# Patient Record
Sex: FEMALE | Race: WHITE | NOT HISPANIC OR LATINO | Employment: FULL TIME | URBAN - METROPOLITAN AREA
[De-identification: names, ages, dates, MRNs, and addresses within clinical notes are randomized per-mention and may not be internally consistent; named-entity substitution may affect disease eponyms.]

---

## 2021-10-26 NOTE — PROGRESS NOTES
Hand Therapy Initial Evaluation  Current Date:  11/1/2021    Diagnosis: Left Wrist Pain  DOI: 9/18/2021  Post:  6w 2d    Precautions: N/A    Subjective:  Anne Torres is a 31 year old female.    Patient reports symptoms of the left wrist which occurred due to overuse in the garden. Since onset symptoms are Gradually getting better.  General health as reported by patient is good.  Pertinent medical history includes:Anemia, Depression, Migraines/Headaches, Thyroid Problems  Medical allergies:sulfa/codiene.  Surgical history: other: jaw.  Medication history: Sleep, Thyroid.    Current occupation is Non Profit   Job Tasks: Computer Work, Prolonged Sitting    Occupational Profile Information:  Right hand dominant  Prior functional level:  no limitations  Patient reports symptoms of pain  Special tests:  none.    Previous treatment: OTC wrist splint -day with use, at night for awhile but has since discontinued  Barriers include:none  Mobility: No difficulty  Transportation: drives  Currently working in normal job without restrictions  Leisure activities/hobbies: cooking, gardening, reading  Other: Has a dog and a cat    Functional Outcome Measure:   Upper Extremity Functional Index Score:  SCORE:   Column Totals: /80: 67   (A lower score indicates greater disability.)    Objective:    Pain Level (Scale 0-10):   11/1/21   At Rest 0-3   With Use 2-6     Pain Description:  Date 11/1/21   Location Left Wrist: webspace, radial wrist, dorsal forearm, occasional tingle in ulnar hand   Pain Quality Aching, fatigue, occasional twinge of pain   Frequency intermittent, constant or daily     Pain is worst daytime and can occasionally keep up at night   Exacerbated by Weightbearing into hand, lifting something heavy, prolonged typing, prolonged repetitive use in the wrist, prolonged driving   Relieved by Splint, rest, heat   Progression Gradually improving     ROM  Pain Report: - none  + mild    ++ moderate    +++  severe   Wrist 11/1/21 11/1/21   AROM (PROM) Right Left   Extension 79 79-   Flexion 82 85+ very mild radial wrist   RD 15 15-   UD 40 35-   Supination 85 85+ very mild volar radial wrist   Pronation 85 85+ very mild radial dorsal wrist   Thumb Flex with Wrist UD 10- 10+     ROM Thumb - WNL bilaterally, no pain with AROM    Strength   (Measured in pounds)  Pain Report: - none  + mild    ++ moderate    +++ severe    11/1/21 11/1/21   Trials Right Left   1  2  3 58 32 to pain   Average 58 32     Lat Pinch 11/1/21 11/1/21   Trials Right Left   1  2  3 15 10 to pain   Average 15 10     3 Pt Pinch 11/1/21 11/1/21   Trials Right Left   1  2  3 13 7 to pain   Average 13 7     Edema:  None    Scar/Wound:  N/A    Sensation: Patient endorses intermittent tingling in SF, likely due to positioning/leaning on elbow.    Special Tests:   Date 11/1/21   Side Left   Tinels CT -   Tinels PIN -   Tinels DSRN -   Tinels cubital tunnel + mild   Tinels [place] -     Resisted Testing  Pain Report: - none  + mild    ++ moderate    +++ severe    11/1/21   APL +   EPB -   EPL -   FCR -   Wrist Flex -   Wrist Ext -   1st DI -     Special Tests   Pain Report:  - none    + mild    ++ moderate    +++ severe    11/1/21   Finkelsteins +   Radial Nerve Tinel's (DRSN) -   Th CMC Passive Retropulsion -     Palpation  Pain Report:  - none    + mild    ++ moderate    +++ severe    11/1/21   Radial Styloid -   1st DC -   FCR +   Thumb CMC -   PIN Site +   Extensor Wad +   LEP -   MEP -   Flexor Wad -   Adductor +   1st DI +     Assessment:  Patient presents with symptoms consistent with diagnosis as listed above with conservative intervention.     Patient's limitations or Problem List includes:  Pain, Weakness, Decreased stability, Decreased , Decreased pinch and Tightness in musculature of the left wrist, thumb and forearm which interferes with the patient's ability to perform Work Tasks, Sleep Patterns, Recreational Activities, Household  Chores and Driving  as compared to previous level of function.    Rehab Potential:  Excellent - Return to full activity, no limitations    Patient will benefit from skilled Occupational Therapy to increase  strength and stability of wrist and decrease pain and muscular tension to return to previous activity level and resume normal daily tasks and to reach their rehab potential.    Barriers to Learning:  No barrier    Communication Issues:  Patient appears to be able to clearly communicate and understand verbal and written communication and follow directions correctly.    Chart Review: Detailed history review with patient    Identified Performance Deficits: driving and community mobility, home establishment and management, meal preparation and cleanup, sleep, work and leisure activities    Assessment of Occupational Performance:  5 or more Performance Deficits    Clinical Decision Making (Complexity): Low complexity    Treatment Explanation:  The following has been discussed with the patient:  RX ordered/plan of care  Anticipated outcomes  Possible risks and side effects    Plan:  Frequency:  1 X week, once daily  Duration:  for 6 weeks    Treatment Plan:  Therapeutic Exercise:  Isotonics and Isometrics  Neuromuscular re-education:  Nerve Gliding and Kinesiotaping  Manual Techniques:  Friction massage and Myofascial release  Orthotic Fabrication:  Forearm based  Self Care:  Self Care Tasks and Ergonomic Considerations    Home Program:  Warmth - Sessions: 3-5 x days week, Epsom salt soaks   Ball Massage to FA flexors and extensors  Golf Ball Massage to Thumb   Adductor release with clip  FA flexor and Extensor stretches  K-tape trial  - radial thumb and wrist up to lateral elbow    Next visit:   Check existing splint, consider ottobock   Check response to K-tape trial  Laser vs US trial  STM to FA flexors, extensors, adductor  Add JUJU sy  Future visits: wrist stability strengthening    Discharge Plan:  Achieve  all LTG.  Independent in home treatment program.  Reach maximal therapeutic benefit.    Please see daily flow sheet for treatment and 1:1 time provided today.

## 2021-11-01 ENCOUNTER — THERAPY VISIT (OUTPATIENT)
Dept: OCCUPATIONAL THERAPY | Facility: CLINIC | Age: 31
End: 2021-11-01
Payer: COMMERCIAL

## 2021-11-01 DIAGNOSIS — M25.532 LEFT WRIST PAIN: ICD-10-CM

## 2021-11-01 PROCEDURE — 97140 MANUAL THERAPY 1/> REGIONS: CPT | Mod: GO | Performed by: OCCUPATIONAL THERAPIST

## 2021-11-01 PROCEDURE — 97165 OT EVAL LOW COMPLEX 30 MIN: CPT | Mod: GO | Performed by: OCCUPATIONAL THERAPIST

## 2021-11-01 PROCEDURE — 97110 THERAPEUTIC EXERCISES: CPT | Mod: GO | Performed by: OCCUPATIONAL THERAPIST

## 2021-11-05 NOTE — PROGRESS NOTES
SOAP Note Objective Information for 11/11/2021    Pain Level (Scale 0-10):   11/1/21 11/11/21   At Rest 0-3 0-1   With Use 2-6 2-4     Pain Description:  Date 11/1/21   Location Left Wrist: webspace, radial wrist, dorsal forearm, occasional tingle in ulnar hand   Pain Quality Aching, fatigue, occasional twinge of pain   Frequency intermittent, constant or daily     Pain is worst daytime and can occasionally keep up at night   Exacerbated by Weightbearing into hand, lifting something heavy, prolonged typing, prolonged repetitive use in the wrist, prolonged driving   Relieved by Splint, rest, heat   Progression Gradually improving     Home Program:  Warmth - Sessions: 3-5 x days week, Epsom salt soaks   Ball Massage to FA flexors and extensors  Golf Ball Massage to Thumb   Adductor release with clip  FA flexor and Extensor stretches  K-tape trial  - radial thumb and wrist up to lateral elbow  Proximal RN glides  Custom [place] - night and day per symptoms    Next visit:   Check ottobock   K-tape if wanted - instruct in application  Laser   STM to FA flexors, extensors, adductor  Instruct in pain free wrist stability strengthening    Please see daily flow sheet for treatment and 1:1 time provided today.

## 2021-11-11 ENCOUNTER — THERAPY VISIT (OUTPATIENT)
Dept: OCCUPATIONAL THERAPY | Facility: CLINIC | Age: 31
End: 2021-11-11
Payer: COMMERCIAL

## 2021-11-11 DIAGNOSIS — M25.532 LEFT WRIST PAIN: ICD-10-CM

## 2021-11-11 PROCEDURE — 97140 MANUAL THERAPY 1/> REGIONS: CPT | Mod: GO | Performed by: OCCUPATIONAL THERAPIST

## 2021-11-11 PROCEDURE — 97026 INFRARED THERAPY: CPT | Mod: GO | Performed by: OCCUPATIONAL THERAPIST

## 2021-11-11 PROCEDURE — 97760 ORTHOTIC MGMT&TRAING 1ST ENC: CPT | Mod: GO | Performed by: OCCUPATIONAL THERAPIST

## 2021-11-11 PROCEDURE — 97112 NEUROMUSCULAR REEDUCATION: CPT | Mod: GO | Performed by: OCCUPATIONAL THERAPIST

## 2021-11-15 NOTE — PROGRESS NOTES
SOAP Note Objective Information for 11/19/2021    Pain Level (Scale 0-10):   11/1/21 11/11/21 11/19/21   At Rest 0-3 0-1 0-1   With Use 2-6 2-4 2-4     Pain Description:  Date 11/1/21   Location Left Wrist: webspace, radial wrist, dorsal forearm, occasional tingle in ulnar hand   Pain Quality Aching, fatigue, occasional twinge of pain   Frequency intermittent, constant or daily     Pain is worst daytime and can occasionally keep up at night   Exacerbated by Weightbearing into hand, lifting something heavy, prolonged typing, prolonged repetitive use in the wrist, prolonged driving   Relieved by Splint, rest, heat   Progression Gradually improving     Home Program:  Warmth - Sessions: 3-5 x days week, Epsom salt soaks   Ball Massage to FA flexors and extensors  Golf Ball Massage to Thumb   Adductor release with clip  FA flexor and Extensor stretches  K-tape trial  - radial thumb and wrist up to lateral elbow  Proximal RN glides  Custom [place] - night and day per symptoms  Wrist strengthening 3 x week:  Door frame pull backs    Next visit:   Laser   STM to FA flexors, extensors, adductor  Progress pain free wrist stability strengthening - isometrics  K-tape    Please see daily flow sheet for treatment and 1:1 time provided today.

## 2021-11-18 NOTE — PROGRESS NOTES
SOAP Note Objective Information for 11/24/2021:    Pain Level (Scale 0-10):   11/1/21 11/11/21 11/24/21   At Rest 0-3 0-1 0-1   With Use 2-6 2-4 2-3     Pain Description:  Date 11/1/21   Location Left Wrist: webspace, radial wrist, dorsal forearm, occasional tingle in ulnar hand   Pain Quality Aching, fatigue, occasional twinge of pain   Frequency intermittent, constant or daily     Pain is worst daytime and can occasionally keep up at night   Exacerbated by Weightbearing into hand, lifting something heavy, prolonged typing, prolonged repetitive use in the wrist, prolonged driving   Relieved by Splint, rest, heat   Progression Gradually improving     Home Program:  Warmth - Sessions: 3-5 x days week, Epsom salt soaks   Ball Massage to FA flexors and extensors  Golf Ball Massage to Thumb   Adductor release with clip  FA flexor and Extensor stretches  K-tape trial  - radial thumb and wrist up to lateral elbow  Proximal RN glides  Custom [place] - night and day per symptoms  Wrist Strengthening:  Door Frame pull backs  Wrist isometrics - flexion and extension    Next visit:   Check response to wrist isometrics  Laser   STM to FA flexors, extensors, adductor  K-tape  Next visit 2 weeks out    Please see daily flow sheet for treatment and 1:1 time provided today.

## 2021-11-19 ENCOUNTER — THERAPY VISIT (OUTPATIENT)
Dept: OCCUPATIONAL THERAPY | Facility: CLINIC | Age: 31
End: 2021-11-19
Payer: COMMERCIAL

## 2021-11-19 DIAGNOSIS — M25.532 LEFT WRIST PAIN: ICD-10-CM

## 2021-11-19 PROCEDURE — 97110 THERAPEUTIC EXERCISES: CPT | Mod: GO | Performed by: OCCUPATIONAL THERAPIST

## 2021-11-19 PROCEDURE — 97140 MANUAL THERAPY 1/> REGIONS: CPT | Mod: GO | Performed by: OCCUPATIONAL THERAPIST

## 2021-11-19 PROCEDURE — 97026 INFRARED THERAPY: CPT | Mod: GO | Performed by: OCCUPATIONAL THERAPIST

## 2021-11-24 ENCOUNTER — THERAPY VISIT (OUTPATIENT)
Dept: OCCUPATIONAL THERAPY | Facility: CLINIC | Age: 31
End: 2021-11-24
Payer: COMMERCIAL

## 2021-11-24 DIAGNOSIS — M25.532 LEFT WRIST PAIN: ICD-10-CM

## 2021-11-24 PROCEDURE — 97026 INFRARED THERAPY: CPT | Mod: GO | Performed by: OCCUPATIONAL THERAPIST

## 2021-11-24 PROCEDURE — 97110 THERAPEUTIC EXERCISES: CPT | Mod: GO | Performed by: OCCUPATIONAL THERAPIST

## 2021-11-24 PROCEDURE — 97140 MANUAL THERAPY 1/> REGIONS: CPT | Mod: GO | Performed by: OCCUPATIONAL THERAPIST

## 2021-12-01 NOTE — PROGRESS NOTES
SOAP Note Objective Information for 12/3/2021:    Pain Level (Scale 0-10):   11/1/21 11/11/21 11/24/21 12/3/21   At Rest 0-3 0-1 0-1 0-1   With Use 2-6 2-4 2-3 2     Pain Description:  Date 11/1/21   Location Left Wrist: webspace, radial wrist, dorsal forearm, occasional tingle in ulnar hand   Pain Quality Aching, fatigue, occasional twinge of pain   Frequency intermittent, constant or daily     Pain is worst daytime and can occasionally keep up at night   Exacerbated by Weightbearing into hand, lifting something heavy, prolonged typing, prolonged repetitive use in the wrist, prolonged driving   Relieved by Splint, rest, heat   Progression Gradually improving     Home Program:  Warmth - Sessions: 3-5 x days week, Epsom salt soaks   Ball Massage to FA flexors and extensors  Golf Ball Massage to Thumb   Adductor release with clip  FA flexor and Extensor stretches  K-tape trial  - radial thumb and wrist up to lateral elbow  Proximal RN glides  Custom [place] - night and day per symptoms  Wrist Strengthening 3 x week:  Door Frame pull backs  Wrist isometrics - flexion and extension    Next visit:   PN due - discuss discharge planning  Laser   STM to FA flexors, extensors, adductor  K-tape  Progress strengthening as indicated    Please see daily flow sheet for treatment and 1:1 time provided today.

## 2021-12-03 ENCOUNTER — THERAPY VISIT (OUTPATIENT)
Dept: OCCUPATIONAL THERAPY | Facility: CLINIC | Age: 31
End: 2021-12-03
Payer: COMMERCIAL

## 2021-12-03 DIAGNOSIS — M25.532 LEFT WRIST PAIN: ICD-10-CM

## 2021-12-03 PROCEDURE — 97026 INFRARED THERAPY: CPT | Mod: GO | Performed by: OCCUPATIONAL THERAPIST

## 2021-12-03 PROCEDURE — 97140 MANUAL THERAPY 1/> REGIONS: CPT | Mod: GO | Performed by: OCCUPATIONAL THERAPIST

## 2021-12-03 PROCEDURE — 97112 NEUROMUSCULAR REEDUCATION: CPT | Mod: GO | Performed by: OCCUPATIONAL THERAPIST

## 2021-12-12 ENCOUNTER — HEALTH MAINTENANCE LETTER (OUTPATIENT)
Age: 31
End: 2021-12-12

## 2021-12-16 NOTE — PROGRESS NOTES
Hand Therapy Progress Note  Reporting Period:  11/1/2021 through 12/20/2021    Diagnosis: Left Wrist Pain  DOI: 9/18/2021  Post:  3 months    Precautions: N/A    Initial History:  Anne Torres is a 31 year old female.    Patient reports symptoms of the left wrist which occurred due to overuse in the garden. Since onset symptoms are Gradually getting better.  General health as reported by patient is good.  Pertinent medical history includes:Anemia, Depression, Migraines/Headaches, Thyroid Problems  Medical allergies:sulfa/codiene.  Surgical history: other: jaw.  Medication history: Sleep, Thyroid.    Current occupation is Non Profit   Job Tasks: Computer Work, Prolonged Sitting    Occupational Profile Information:  Right hand dominant  Prior functional level:  no limitations  Patient reports symptoms of pain  Special tests:  none.    Previous treatment: OTC wrist splint -day with use, at night for awhile but has since discontinued  Barriers include:none  Mobility: No difficulty  Transportation: drives  Currently working in normal job without restrictions  Leisure activities/hobbies: cooking, gardening, reading  Other: Has a dog and a cat    Upper Extremity Functional Index Score:  SCORE:   Column Totals: /80: 73   (A lower score indicates greater disability.)    Subjective:  Subjective changes as noted by patient: The wrist has been feeling good! Its not even as fatigued after the strengthening anymore. It was tired after snow removal but rebounded quickly.  Functional changes noted by patient: Improvement in Recreational Activities and Household Chores  Response to previous treatment: good  Patient has noted adverse reaction to: None    Objective:    Pain Level (Scale 0-10):   11/1/21 11/11/21 11/24/21 12/3/21 12/20/21   At Rest 0-3 0-1 0-1 0-1 0   With Use 2-6 2-4 2-3 2 0-1     Pain Description:  Date 11/1/21 12/20/21   Location Left Wrist: webspace, radial wrist, dorsal forearm, occasional  tingle in ulnar hand Left Wrist: webspace, radial wrist, dorsal forearm, occasional tingle in ulnar hand   Pain Quality Aching, fatigue, occasional twinge of pain fatigue   Frequency intermittent, constant or daily   intermittent   Pain is worst daytime and can occasionally keep up at night daytime   Exacerbated by Weightbearing into hand, lifting something heavy, prolonged typing, prolonged repetitive use in the wrist, prolonged driving Opening a jar, bearing weight into wrist   Relieved by Splint, rest, heat Splint, exercises, rest, massage   Progression Gradually improving resolving     ROM  Pain Report: - none  + mild    ++ moderate    +++ severe   Wrist 11/1/21 11/1/21   AROM (PROM) Right Left   Extension 79 79-   Flexion 82 85+ very mild radial wrist   RD 15 15-   UD 40 35-   Supination 85 85+ very mild volar radial wrist   Pronation 85 85+ very mild radial dorsal wrist   Thumb Flex with Wrist UD 10- 10+     ROM Thumb - WNL bilaterally, no pain with AROM    Strength   (Measured in pounds)  Pain Report: - none  + mild    ++ moderate    +++ severe    11/1/21 11/1/21 12/20/21   Trials Right Left Left   1  2  3 58 32 to pain 41-   Average 58 32 41     Lat Pinch 11/1/21 11/1/21 12/20/21   Trials Right Left Left   1  2  3 15 10 to pain 12-   Average 15 10 12     3 Pt Pinch 11/1/21 11/1/21 12/20/21   Trials Right Left Left   1  2  3 13 7 to pain 10-   Average 13 7 10     Edema:  None    Scar/Wound:  N/A    Sensation: Patient endorses intermittent tingling in SF, likely due to positioning/leaning on elbow.    Special Tests:   Date 11/1/21 12/20/21   Side Left Left   Tinels CT - -   Tinels PIN - -   Tinels DSRN - -   Tinels cubital tunnel + mild -   Tinels [place] - -     Resisted Testing  Pain Report: - none  + mild    ++ moderate    +++ severe    11/1/21 12/20/21   APL + -   EPB - -   EPL - -   FCR - -   Wrist Flex - -   Wrist Ext - -   1st DI - -     Special Tests   Pain Report:  - none    + mild    ++  moderate    +++ severe    11/1/21 12/20/21   Finkelsteins + stretch   Radial Nerve Tinel's (DRSN) - NT   Th CMC Passive Retropulsion - NT     Palpation  Pain Report:  - none    + mild    ++ moderate    +++ severe    11/1/21 12/20/21   Radial Styloid - -   1st DC - -   FCR + +   Thumb CMC - -   PIN Site + -   Extensor Wad + -   LEP - -   MEP - -   Flexor Wad - -   Adductor + tender   1st DI + -   Intersection area  tender     Assessment:  Response to therapy has been improvement to:  Strength:   and pinch  Pain:  frequency is less, intensity of pain is decreased, duration of pain is decreased and less tender over affected area  Overall Assessment:  Patient's symptoms are resolving.  Patient is progressing well and is ready to discharge to home exercise program.  STG/LTG:  See goal sheet for details and updates of remaining functional limitations.     Plan:  Discharge to [facility]    Home Program:  Warmth - Sessions: 3-5 x days week, Epsom salt soaks   Ball Massage to FA flexors and extensors  Golf Ball Massage to Thumb   Adductor release with clip  FA flexor and Extensor stretches  K-tape trial  - radial thumb and wrist up to lateral elbow  Proximal RN glides  Custom [place] - night and day per symptoms  Wrist Strengthening 3 x week:  Door Frame pull backs  Wrist isometrics - flexion and extension    Please see daily flow sheet for treatment and 1:1 time provided today.

## 2021-12-20 ENCOUNTER — THERAPY VISIT (OUTPATIENT)
Dept: OCCUPATIONAL THERAPY | Facility: CLINIC | Age: 31
End: 2021-12-20
Payer: COMMERCIAL

## 2021-12-20 DIAGNOSIS — M25.532 LEFT WRIST PAIN: ICD-10-CM

## 2021-12-20 PROCEDURE — 97110 THERAPEUTIC EXERCISES: CPT | Mod: GO | Performed by: OCCUPATIONAL THERAPIST

## 2021-12-20 PROCEDURE — 97140 MANUAL THERAPY 1/> REGIONS: CPT | Mod: GO | Performed by: OCCUPATIONAL THERAPIST

## 2021-12-20 PROCEDURE — 97026 INFRARED THERAPY: CPT | Mod: GO | Performed by: OCCUPATIONAL THERAPIST

## 2022-10-03 ENCOUNTER — HEALTH MAINTENANCE LETTER (OUTPATIENT)
Age: 32
End: 2022-10-03

## 2022-12-21 ENCOUNTER — TRANSCRIBE ORDERS (OUTPATIENT)
Dept: OTHER | Age: 32
End: 2022-12-21

## 2022-12-21 DIAGNOSIS — R19.5 LOOSE STOOLS: Primary | ICD-10-CM

## 2022-12-22 ENCOUNTER — MYC MEDICAL ADVICE (OUTPATIENT)
Dept: CALL CENTER | Age: 32
End: 2022-12-22

## 2022-12-28 ENCOUNTER — TELEPHONE (OUTPATIENT)
Dept: GASTROENTEROLOGY | Facility: CLINIC | Age: 32
End: 2022-12-28

## 2022-12-28 NOTE — TELEPHONE ENCOUNTER
Caller: Anne Torres    Reason for Reschedule/Cancellation (please be detailed, any staff messages or encounters to note?): Needs to reschedule       Prior to reschedule please review:    Ordering Provider:Jasmine Atkinson MD     Sedation per order:NA    Does patient have any ASC Exclusions, please identify?: NA      Notes on Cancelled Procedure:    Procedure:Hydrogen Breath Test [HBT]     Date: 12/30    Location:[facility]; [address] [place] [address], [address] [place] [address]    Surgeon: NA        Rescheduled: Yes    Procedure: Hydrogen Breath Test [HBT]     Date: 01/6    Location:[facility]; [address] [place] [address], [address] [place] [address]    Surgeon: NA    Sedation Level Scheduled  NA,  Reason for Sedation Level NA    Prep/Instructions updated and sent: KATIE

## 2022-12-29 ENCOUNTER — PATIENT OUTREACH (OUTPATIENT)
Dept: GASTROENTEROLOGY | Facility: CLINIC | Age: 32
End: 2022-12-29

## 2022-12-29 NOTE — TELEPHONE ENCOUNTER
Left message for pt to call back with any questions about upcoming hbt. Sent instructions through [email or web address]

## 2023-01-06 ENCOUNTER — OFFICE VISIT (OUTPATIENT)
Dept: GASTROENTEROLOGY | Facility: CLINIC | Age: 33
End: 2023-01-06
Payer: COMMERCIAL

## 2023-01-06 VITALS
SYSTOLIC BLOOD PRESSURE: 118 MMHG | BODY MASS INDEX: 24.84 KG/M2 | WEIGHT: 135 LBS | HEART RATE: 81 BPM | OXYGEN SATURATION: 98 % | HEIGHT: 62 IN | DIASTOLIC BLOOD PRESSURE: 72 MMHG

## 2023-01-06 DIAGNOSIS — R19.7 DIARRHEA: ICD-10-CM

## 2023-01-06 DIAGNOSIS — K63.8219 SMALL INTESTINAL BACTERIAL OVERGROWTH (SIBO): Primary | ICD-10-CM

## 2023-01-06 PROCEDURE — 91065 BREATH HYDROGEN/METHANE TEST: CPT

## 2023-01-06 ASSESSMENT — PAIN SCALES - GENERAL: PAINLEVEL: NO PAIN (0)

## 2023-01-06 NOTE — PROGRESS NOTES
"Non-Invasive GI Procedure Visit    Anne Torres is a 32 year old female with history of Data Unavailable.   Patient stated reason for procedure: Diarrhea and history of SIBO 2017      COVID-19 PCR Results    COVID-19 PCR Results   No data to display.         COVID-19 Antibody Results, Testing for Immunity    COVID-19 Antibody Results, Testing for Immunity   No data to display.             Pre-Procedure Assessment  Patient presents to clinic today for Hydrogen Breath Test    Referring Provider: Dr. Jasmine Atkinson    Previous HBT: Yes  Is patient a smoker: No    Does patient report having a colonoscopy, barium study, barium enema or taking antibiotics within the last 2 weeks? Yes / No: No.  Does patient report taking a stool softener, fiber supplement, laxative or anti-diarrheal in the last 3 - 4 days? Yes / No: No.  Does the patient report taking a probiotic in the last 1 - 2 days? Yes / No: No.  Does the patient report taking any medications today? No    Does the patient report following the pre-procedure bland diet? Yes  Does patient report being NPO for a minimum of 12 hours before the test? Yes.     Patient reported symptoms:Diarrhea SIBO  How long has patient had these symptoms? Previously positive SIBO 2017, re-emergence since COVID dx inn September      Patient Hx  Patient's history, medications and allergies were reviewed.     Height: 5' 2\"   Weight: 135 lbs 0 oz    There are no problems to display for this patient.     Prior to Admission medications    Not on File     Allergies   Allergen Reactions     Sulfa Drugs      No past medical history on file.  No past surgical history on file.  No family history on file.  Social History     Tobacco Use     Smoking status: Not on file     Smokeless tobacco: Not on file   Substance Use Topics     Alcohol use: Not on file        Pre-Procedure Education & Consent  Procedure education was provided to: Patient  Teaching method: Explanation  Barriers to learning: No " Barrier    Patient indicated understanding of pre-procedure instruction and appropriate consent was obtained and documented.    ____________________________________________________________________    Post-Procedure Documentation: Hydrogen Breath Test     Baseline breath obtained prior to patient drinking Lactulose.     Patient tolerated test with abdominal pain, diarrhea, bloating and belching.    HBT Results    Sample Clock Times Ppm H2 Ppm CH4 CO2% Comments   Baseline 1014 3 3 3.9     Challenge Dose Given 1015 - - - -   #1 1035 6 3 3.9     #2 1055 38 6 3.7     #3 1115 79 8 3.6 Bloating;Abdominal Pain;Diarrhea   #4 1135 71 7 3.7     #5 1155 67 7 3.7 Abdominal Pain (belching)   #6 1215 59 7 3.8     #7 1235 42 7 3.3     #8 1255 33 6 3.7 Abdominal Pain   #9 1315 35 5 3.6       #  B   Patient given discharge instructions.    Notification of pending test results sent to provider for interpretation. Please reference scanned document for final interpretation of results. Patient will follow up with referring provider for test results.    Fela Ronquillo RN on 1/6/2023 at 10:07 AM

## 2023-02-11 ENCOUNTER — HEALTH MAINTENANCE LETTER (OUTPATIENT)
Age: 33
End: 2023-02-11

## 2023-12-06 DIAGNOSIS — Z00.6 EXAMINATION OF PARTICIPANT OR CONTROL IN CLINICAL RESEARCH: Primary | ICD-10-CM

## 2024-03-09 ENCOUNTER — HEALTH MAINTENANCE LETTER (OUTPATIENT)
Age: 34
End: 2024-03-09

## 2024-04-15 ENCOUNTER — HOSPITAL ENCOUNTER (OUTPATIENT)
Dept: MRI IMAGING | Facility: CLINIC | Age: 34
Discharge: HOME | End: 2024-04-15
Attending: INTERNAL MEDICINE

## 2024-04-15 ENCOUNTER — HOSPITAL ENCOUNTER (OUTPATIENT)
Dept: CARDIOLOGY | Facility: CLINIC | Age: 34
Discharge: HOME | End: 2024-04-15
Attending: INTERNAL MEDICINE

## 2024-04-15 ENCOUNTER — LAB (OUTPATIENT)
Dept: LAB | Facility: CLINIC | Age: 34
End: 2024-04-15
Attending: INTERNAL MEDICINE

## 2024-04-15 DIAGNOSIS — Z00.6 EXAMINATION OF PARTICIPANT OR CONTROL IN CLINICAL RESEARCH: Primary | ICD-10-CM

## 2024-04-15 DIAGNOSIS — Z00.6 EXAMINATION OF PARTICIPANT OR CONTROL IN CLINICAL RESEARCH: ICD-10-CM

## 2024-04-15 LAB
CREAT SERPL-MCNC: 0.63 MG/DL (ref 0.51–0.95)
EGFRCR SERPLBLD CKD-EPI 2021: >90 ML/MIN/1.73M2
HCT VFR BLD AUTO: 36.9 % (ref 35–47)

## 2024-04-15 PROCEDURE — 94621 CARDIOPULM EXERCISE TESTING: CPT

## 2024-04-15 PROCEDURE — 85014 HEMATOCRIT: CPT

## 2024-04-15 PROCEDURE — 75561 CARDIAC MRI FOR MORPH W/DYE: CPT

## 2024-04-15 PROCEDURE — A9585 GADOBUTROL INJECTION: HCPCS | Performed by: INTERNAL MEDICINE

## 2024-04-15 PROCEDURE — 94621 CARDIOPULM EXERCISE TESTING: CPT | Mod: 26 | Performed by: INTERNAL MEDICINE

## 2024-04-15 PROCEDURE — 36415 COLL VENOUS BLD VENIPUNCTURE: CPT

## 2024-04-15 PROCEDURE — 82565 ASSAY OF CREATININE: CPT

## 2024-04-15 PROCEDURE — 75561 CARDIAC MRI FOR MORPH W/DYE: CPT | Mod: 26 | Performed by: INTERNAL MEDICINE

## 2024-04-15 PROCEDURE — 255N000002 HC RX 255 OP 636: Performed by: INTERNAL MEDICINE

## 2024-04-15 RX ORDER — GADOBUTROL 604.72 MG/ML
7.5 INJECTION INTRAVENOUS ONCE
Status: COMPLETED | OUTPATIENT
Start: 2024-04-15 | End: 2024-04-15

## 2024-04-15 RX ADMIN — GADOBUTROL 7.5 ML: 604.72 INJECTION INTRAVENOUS at 08:49

## 2024-04-16 ENCOUNTER — ALLIED HEALTH/NURSE VISIT (OUTPATIENT)
Dept: CARDIOLOGY | Facility: CLINIC | Age: 34
End: 2024-04-16
Payer: COMMERCIAL

## 2024-04-16 DIAGNOSIS — Z00.6 RESEARCH STUDY PATIENT: Primary | ICD-10-CM

## 2024-04-16 LAB
CARDIOPULMONARY ANAEROBIC THRESHOLD PREDICTED PEAK: 107 %
CARDIOPULMONARY ANAEROBIC THRESHOLD VO2: 34.5 ML/KG/MIN
CARDIOPULMONARY BLOOD PRESSURE REST: NORMAL MMHG
CARDIOPULMONARY BREATHING RESERVE REST: 90.9
CARDIOPULMONARY BREATHING RESERVE V02MAX: 5
CARDIOPULMONARY CO2 OUTPUT REST: 249 ML/MIN
CARDIOPULMONARY CO2 OUTPUT VO2MAX: 2952 ML/MIN
CARDIOPULMONARY FEV 1.0 (L) ACTUAL: 2.86
CARDIOPULMONARY FEV 1.0 (L) PRECENT: 98 %
CARDIOPULMONARY FEV 1.0 (L) PREDICTED: 2.92
CARDIOPULMONARY FEV 1.0 FVC (%) ACTUAL: 82
CARDIOPULMONARY FEV 1.0 FVC (%) PERCENT: 99 %
CARDIOPULMONARY FEV 1.0 FVC (%) PREDICTED: 83
CARDIOPULMONARY FUNCTIONAL CAPACITY MAX ML/KG/MIN: 32.3 ML/KG/MIN
CARDIOPULMONARY FUNCTIONAL CAPACITY PERCENT: 86 %
CARDIOPULMONARY FUNCTIONAL CAPACITY PREDICTED: 37.4 ML/KG/MIN
CARDIOPULMONARY FVC (L) ACTUAL: 3.5
CARDIOPULMONARY FVC (L) PERCENT: 100 %
CARDIOPULMONARY FVC (L) PREDICTED: 3.51
CARDIOPULMONARY HEART RATE REST: 63 BPM
CARDIOPULMONARY MET'S REST: 1.5
CARDIOPULMONARY MINUTE VENTILATION REST: 9.1 L/MIN
CARDIOPULMONARY MINUTE VENTILATION VO2MAX: 96.1 L/MIN
CARDIOPULMONARY MYOCARDIAC O2 DEMAND MAX: NORMAL
CARDIOPULMONARY OXYGEN CONSUMPTION REST: 5.1 ML/KG/MIN
CARDIOPULMONARY OXYGEN CONSUMPTION VO2MAX: 37.5 ML/KG/MIN
CARDIOPULMONARY OXYGEN PULSE REST: 5.1 ML/BEAT
CARDIOPULMONARY OXYGEN PULSE VO2MAX: 14.5 ML/BEAT
CARDIOPULMONARY OXYGEN SATURATION- OXIMETRY REST: 100 %
CARDIOPULMONARY OXYGEN SATURATION- OXIMETRY VO2MAX: 99 %
CARDIOPULMONARY PET C02 REST: 33
CARDIOPULMONARY PET C02 VO2MAX: 34
CARDIOPULMONARY PET02 REST: 103
CARDIOPULMONARY PET02 V02 MAX: 115
CARDIOPULMONARY RER: 1.16
CARDIOPULMONARY RESPIRALORY EXCHANGE RATIO VO2MAX: 1.16
CARDIOPULMONARY RESPIRALORY EXCHANGE RATIO: 0.77
CARDIOPULMONARY RESPIRATORY RATE REST: 13 BR/MIN
CARDIOPULMONARY RESPIRATORY RATE VO2MAX: 50 BR/MIN
CARDIOPULMONARY STRESS BASE 1 BP MMHG: NORMAL MMHG
CARDIOPULMONARY STRESS BASE 1 BPA: 133 BPM
CARDIOPULMONARY STRESS BASE 1 SPO2: 100 % SPO2
CARDIOPULMONARY STRESS BASE 1 TIME SEC: 0 SEC
CARDIOPULMONARY STRESS BASE 1 TIME: 1 MINS
CARDIOPULMONARY STRESS BASE 2 BP MMHG: NORMAL MMHG
CARDIOPULMONARY STRESS BASE 2 BPA: 108 BPM
CARDIOPULMONARY STRESS BASE 2 SPO2: 100 % SPO2
CARDIOPULMONARY STRESS BASE 2 TIME SEC: 0 SEC
CARDIOPULMONARY STRESS BASE 2 TIME: 3 MINS
CARDIOPULMONARY STRESS BASE 3 BP MMHG: NORMAL MMHG
CARDIOPULMONARY STRESS BASE 3 BPA: 98 BPM
CARDIOPULMONARY STRESS BASE 3 SPO2: 100 % SPO2
CARDIOPULMONARY STRESS BASE 3 TIME SEC: 0 SEC
CARDIOPULMONARY STRESS BASE 3 TIME: 5 MINS
CARDIOPULMONARY STRESS PHASE 1 BP MMHG: NORMAL MMHG
CARDIOPULMONARY STRESS PHASE 1 BPM: 100 BPM
CARDIOPULMONARY STRESS PHASE 1 SPO2: 100 % SPO2
CARDIOPULMONARY STRESS PHASE 1 TIME SEC: 0 SEC
CARDIOPULMONARY STRESS PHASE 1 TIME: 3 MINS
CARDIOPULMONARY STRESS PHASE 2 BP MMHG: NORMAL MMHG
CARDIOPULMONARY STRESS PHASE 2 BPM: 117 BPM
CARDIOPULMONARY STRESS PHASE 2 SPO2: 99 % SPO2
CARDIOPULMONARY STRESS PHASE 2 TIME SEC: 0 SEC
CARDIOPULMONARY STRESS PHASE 2 TIME: 6 MINS
CARDIOPULMONARY STRESS PHASE 3 BP MMHG: NORMAL MMHG
CARDIOPULMONARY STRESS PHASE 3 BPM: 140 BPM
CARDIOPULMONARY STRESS PHASE 3 SPO2: 99 % SPO2
CARDIOPULMONARY STRESS PHASE 3 TIME SEC: 0 SEC
CARDIOPULMONARY STRESS PHASE 3 TIME: 9 MINS
CARDIOPULMONARY STRESS PHASE 4 BP MMHG: NORMAL MMHG
CARDIOPULMONARY STRESS PHASE 4 BPM: 164 BPM
CARDIOPULMONARY STRESS PHASE 4 SPO2: 99 % SPO2
CARDIOPULMONARY STRESS PHASE 4 TIME SEC: 12 SEC
CARDIOPULMONARY STRESS PHASE 4 TIME: 12 MINS
CARDIOPULMONARY SVC (L) ACTUAL: 3.59
CARDIOPULMONARY SVC (L) PERCENT: 102 %
CARDIOPULMONARY SVC (L) PREDICTED: 3.51
CARDIOPULMONARY TIDAL VOLUME REST: 724 ML
CARDIOPULMONARY TIDAL VOLUME VO2MAX: 1929 ML
CARDIOPULMONARY VE/VCO2 SLOPE: 33.54
CARDIOPULMONARY VENTILATORY EQUIVALENT 02 REST: 28
CARDIOPULMONARY VENTILATORY EQUIVALENT 02 V02: 35
CARDIOPULMONARY VENTILATORY EQUIVALENT C02 REST: 36
CARDIOPULMONARY VENTILATORY EQUIVALENT C02 SLOPE VO2MAX: 33.54
CARDIOPULMONARY VENTILATORY EQUIVALENT C02 VO2MAX: 33
CV STRESS MAX HR HE: 164
PREDICTED VO2MAX: 37.4
RATED PERCEIVED EXERTION: 17
STRESS ANGINA INDEX: 1
STRESS ECHO BASELINE BP: NORMAL MMHG
STRESS ECHO BASELINE HR: 58 BPM
STRESS ECHO CALCULATED PERCENT HR: 89 %
STRESS ECHO LAST STRESS BP: NORMAL MMHG
STRESS ECHO POST ESTIMATED WORKLOAD: 10.7 METS
STRESS ECHO POST EXERCISE DUR MIN: 12 MIN
STRESS ECHO POST EXERCISE DUR SEC: 12 SEC
STRESS ECHO TARGET HR: 185

## 2024-04-16 NOTE — PROGRESS NOTES
COVID CVD    Study Title: Immunologic Basis of Cardiovascular Disease after COVID-19    IRB: [id number]  PI: Dr. Hunter Cleaning Pager:[phone]  Research Nurse Coordinator: Raquel Jacobsen RN - Phone: [phone] Pager: [phone]  : Ange Gr, CRC - Phone: [phone], Ashley Soria, CRC - Phone [phone]     Patient was approached for possible participation for the above study. Inclusion and exclusion criteria reviewed. Patient meets all of the inclusion criteria and does not meet any of the exclusion criteria. The current approved IRB consent form was discussed and explained to the patient.  It was discussed that involvement with the study is voluntary and refusal to participate would not involve penalty or decrease benefits at which the patient is entitled, and the subject may discontinue involvement at any time without penalty or loss in benefits. The consent form was reviewed including purpose, research hypothesis, nature of the research, risk & benefits. Also reviewed were confidentiality issues, compensation for injury, and alternative procedures available. The patient was given time to review and ask any questions about the consent. Patient was shown contact information for PI and study staff in consent for future questions. Patient verbalized understanding of consent and study by restating the purpose, procedures, duration, risk, confidentiality of PHI, and voluntarily participation. Questions and concerns were addressed. Patient printed, signed and dated the consent/HIPAA form prior to study involvement. A copy was given to the patient for their records.     Subject Consent/HIPAA: SIGNED ON 16 APR 2024  Marifer Holt

## 2025-03-16 ENCOUNTER — HEALTH MAINTENANCE LETTER (OUTPATIENT)
Age: 35
End: 2025-03-16